# Patient Record
Sex: MALE
[De-identification: names, ages, dates, MRNs, and addresses within clinical notes are randomized per-mention and may not be internally consistent; named-entity substitution may affect disease eponyms.]

---

## 2020-07-05 ENCOUNTER — TRANSCRIPTION ENCOUNTER (OUTPATIENT)
Age: 70
End: 2020-07-05

## 2024-02-26 ENCOUNTER — APPOINTMENT (OUTPATIENT)
Dept: DERMATOLOGY | Facility: CLINIC | Age: 74
End: 2024-02-26
Payer: MEDICARE

## 2024-02-26 DIAGNOSIS — C44.519 BASAL CELL CARCINOMA OF SKIN OF OTHER PART OF TRUNK: ICD-10-CM

## 2024-02-26 PROBLEM — Z00.00 ENCOUNTER FOR PREVENTIVE HEALTH EXAMINATION: Status: ACTIVE | Noted: 2024-02-26

## 2024-02-26 PROCEDURE — 99203 OFFICE O/P NEW LOW 30 MIN: CPT

## 2024-02-26 NOTE — PHYSICAL EXAM
[Alert] : alert [Well Nourished] : well nourished [Oriented x 3] : ~L oriented x 3 [Conjunctiva Non-injected] : conjunctiva non-injected [No Visual Lymphadenopathy] : no visual  lymphadenopathy [No Clubbing] : no clubbing [No Edema] : no edema [No Bromhidrosis] : no bromhidrosis [No Chromhidrosis] : no chromhidrosis [Hair] : Hair [Scalp] : Scalp [Face] : Face [Nose] : Nose [Eyelids] : Eyelids [Ears] : Ears [Lips] : Lips [Neck] : Neck [Chest] : Chest [Abdomen] : Abdomen [Nodes] : Nodes [Back] : Back [FreeTextEntry3] : -left neck with 2.4 cm x 2.0 cm pink pearly plaque -no cervical adenopathy

## 2024-02-26 NOTE — PHYSICAL EXAM
Letter sent.    [Alert] : alert [Well Nourished] : well nourished [Oriented x 3] : ~L oriented x 3 [No Visual Lymphadenopathy] : no visual  lymphadenopathy [Conjunctiva Non-injected] : conjunctiva non-injected [No Clubbing] : no clubbing [No Edema] : no edema [No Bromhidrosis] : no bromhidrosis [No Chromhidrosis] : no chromhidrosis [Hair] : Hair [Scalp] : Scalp [Face] : Face [Nose] : Nose [Eyelids] : Eyelids [Lips] : Lips [Ears] : Ears [Neck] : Neck [Chest] : Chest [Abdomen] : Abdomen [Nodes] : Nodes [Back] : Back [FreeTextEntry3] : -left neck with 2.4 cm x 2.0 cm pink pearly plaque -no cervical adenopathy

## 2024-02-26 NOTE — HISTORY OF PRESENT ILLNESS
[FreeTextEntry1] : Infiltrative BCC Left Neck  [de-identified] : Mohs Surgery Consultation  02/26/2024   Referred by: Dr. Alexis Rnad, [facility]  We had the pleasure of seeing your patient in consultation for Mohs Micrographic Surgery.  Mr. FAUSTO FAJARDO is a 73 year old M who presents for evaluation of recently biopsied BCC on the left neck with infiltrative features. Not sure how long it had been there. No previous treatment.   Pertinent positives noted below  History of HIV or hepatitis: No Blood thinners: none Antibiotic Prophylaxis: None  Medical implants: None  Social History: no tobacco or alcohol   The patient's review of systems questionnaire was reviewed. Education needs were identified. There were no barriers to learning.

## 2024-02-26 NOTE — ASSESSMENT
[FreeTextEntry1] : Mohs surgery consultation for Infiltrative BCC Left neck  -- I explained the treatment options of topical immunomodulatory or chemotherapy, electrodessication and curettage, radiation therapy, excision and Mohs surgery.  I recommended Mohs surgery due to the tumor type, location, and ill-defined nature of cancer.   Mohs Appropriate Use Criteria (AUC) Score: 9   I explained that following extirpation there will be a full thickness defect of the involved area. The reconstructive options will be based on the defect size and surrounding tissue laxity of the involved area. Primary closure is only possible for smaller defects. For larger defects, local tissue rearrangement or skin grafting may be necessary. Risks following layered primary closure or local tissue rearrangement include wound dehiscence, contour irregularity, bleeding, infection, and paresthesia (nerve damage including sensory deficit or motor weakness). Risks following skin grafting include wound dehiscence, skin graft nonadherence (partial or complete), contour irregularity, bleeding, infection, paresthesia, and donor site complications. I explained that the patient will need to abstain from physical activity for 1-2 weeks following the surgery, that they would heal with a scar, and also discussed the chances of infection, bleeding, potential sensory or motor nerve damage, and recurrence.  The patient indicated that s/he understood the risks and wished to schedule the procedure. -- In particular, for reconstruction we discussed linear closure  Thank you for this Mohs surgery referral. We recommended that Mr. FAUSTO FAJARDO follow up with His referring dermatologist for routine skin exams following surgery.   Bay He MD Physician, Dermatology & Dermatologic Surgery [facility]

## 2024-02-26 NOTE — ASSESSMENT
[FreeTextEntry1] : Mohs surgery consultation for Infiltrative BCC Left neck  -- I explained the treatment options of topical immunomodulatory or chemotherapy, electrodessication and curettage, radiation therapy, excision and Mohs surgery.  I recommended Mohs surgery due to the tumor type, location, and ill-defined nature of cancer.   Mohs Appropriate Use Criteria (AUC) Score: 9   I explained that following extirpation there will be a full thickness defect of the involved area. The reconstructive options will be based on the defect size and surrounding tissue laxity of the involved area. Primary closure is only possible for smaller defects. For larger defects, local tissue rearrangement or skin grafting may be necessary. Risks following layered primary closure or local tissue rearrangement include wound dehiscence, contour irregularity, bleeding, infection, and paresthesia (nerve damage including sensory deficit or motor weakness). Risks following skin grafting include wound dehiscence, skin graft nonadherence (partial or complete), contour irregularity, bleeding, infection, paresthesia, and donor site complications. I explained that the patient will need to abstain from physical activity for 1-2 weeks following the surgery, that they would heal with a scar, and also discussed the chances of infection, bleeding, potential sensory or motor nerve damage, and recurrence.  The patient indicated that s/he understood the risks and wished to schedule the procedure. -- In particular, for reconstruction we discussed linear closure  Thank you for this Mohs surgery referral. We recommended that Mr. FAUSTO FAJARDO follow up with His referring dermatologist for routine skin exams following surgery.   Bay He MD Physician, Dermatology & Dermatologic Surgery [facility]

## 2024-02-26 NOTE — HISTORY OF PRESENT ILLNESS
[FreeTextEntry1] : Infiltrative BCC Left Neck  [de-identified] : Mohs Surgery Consultation  02/26/2024   Referred by: Dr. Alexis Rand, [facility]  We had the pleasure of seeing your patient in consultation for Mohs Micrographic Surgery.  Mr. FAUSTO FAJARDO is a 73 year old M who presents for evaluation of recently biopsied BCC on the left neck with infiltrative features. Not sure how long it had been there. No previous treatment.   Pertinent positives noted below  History of HIV or hepatitis: No Blood thinners: none Antibiotic Prophylaxis: None  Medical implants: None  Social History: no tobacco or alcohol   The patient's review of systems questionnaire was reviewed. Education needs were identified. There were no barriers to learning.

## 2024-03-27 ENCOUNTER — APPOINTMENT (OUTPATIENT)
Dept: DERMATOLOGY | Facility: CLINIC | Age: 74
End: 2024-03-27
Payer: MEDICARE

## 2024-03-27 DIAGNOSIS — C44.41 BASAL CELL CARCINOMA OF SKIN OF SCALP AND NECK: ICD-10-CM

## 2024-03-27 PROCEDURE — 17311 MOHS 1 STAGE H/N/HF/G: CPT

## 2024-03-27 PROCEDURE — 12042 INTMD RPR N-HF/GENIT2.6-7.5: CPT

## 2024-03-27 RX ORDER — MUPIROCIN 20 MG/G
2 OINTMENT TOPICAL TWICE DAILY
Qty: 1 | Refills: 6 | Status: ACTIVE | COMMUNITY
Start: 2024-03-27 | End: 1900-01-01

## 2024-03-28 PROBLEM — C44.41 BASAL CELL CARCINOMA (BCC) OF LEFT SIDE OF NECK: Status: ACTIVE | Noted: 2024-02-26

## 2024-03-28 NOTE — PROCEDURE
[TextEntry] : Mohs Surgery Procedure Note   A surgical time out was taken to confirm the patient's correct identity and the correct site. The operative site was identified by the patient and surgical team prior to surgery and the patient agreed to proceed with the surgical site which was marked prior to anesthesia infiltration.   Mohs Micrographic Surgery Report Date Collected: 03/27/2024 Date Received: Same as above Date Verified: Same as above Attending Surgeon: Bay He MD Assistants: Monie Hinkle RN, Lin Candelario MA Procedure#:  Diagnosis: infiltrative BCC Location: left neck Pre-op Size: 2.1 cm x 1.8 cm  Post-Operative Final Defect Size: 4.0 cm x 3.5 cm  Stages (number of pieces per stage): 1 (2/1)  Pathology, if tumor noted in stages: Debulk with atypical basaloid cells consistent with BCC. Stage I with Sparse perivascular lymphocytic infiltrate and no evidence of residual carcinoma.  Indications for procedure: Ill-defined tumor margins, high-priority anatomic location for preservation of normal tissue, aggressive histologic nature of the tumor Repair type: intermediate linear layered  Subcutaneous and dermal sutures used: 3-0 vicryl, 4-0 vicryl Epidermal sutures: 4-0 chromic Total volume of anesthesia: 16 ml Repair length: 7.0 cm    Mohs Procedure Report:   The patient was escorted to the outpatient surgical operatory. The proposed Mohs procedure and reconstruction options were discussed with the patient. The risks, benefits, and alternatives were discussed and the patient signed a written consent form. A time out was taken to confirm the patient's identity and the exact location of the skin cancer. After the patient was placed in a recumbent position, the surgical site was cleaned with alcohol and either Betadine (for eyes and ears) or chlorhexidine gluconate, draped, and infiltrated with 0.5%  lidocaine with 1:200,000 epinephrine local anesthetic. A sterile surgical marking pen was used to outline a thin margin of normal-appearing skin around the tumor. A beveled incision was then made using a scalpel. Small orienting nicks were made on the specimen and the surrounding skin. The tissue was then sharply dissected from the surrounding skin. Hemostasis was maintained with the electrosurgical unit and/or pressure. A temporary dressing was placed on the surgical defect until the frozen section slides were interpreted. The oriented specimen was placed in a Xiomy dish and transported to the Mohs laboratory. For each stage of the procedure, a visual representation of the specimen was drawn on a Mohs map. This map graphically depicts the specimen's two-dimensional appearance, orientation, and tissue preparation, which consists of dividing the specimen and applying tissue dyes. Because the deep and peripheral portions of the tissue are then embedded in the same geometric plane, the map also represents an oriented scale drawing of the resulting histologic sections. The Mohs technician then prepared frozen section slides using standard techniques. The slides were stained with hematoxylin and eosin, and cover slips were applied. The frozen section slides were then examined under the microscope. If tumor was found, it was localized on the map. The orienting nicks on the original specimen corresponded to similar nicks on the surgical defect so areas of identified tumor could be mapped back to the patient and resected. Additional layers of removed skin were then processed as indicated above. This iterative process continued as applicable until no tumor was observed microscopically. At this stage, the Mohs resection was complete.   INTERMEDIATE LINEAR LAYERED CLOSURE RECONSTRUCTION PROCEDURE NOTE   Prior to beginning the procedure, patient identity was verified, as well as the procedure to be performed and the site.  All equipment required was ready and available. The patient was positioned appropriately.  INDICATIONS:  Various reconstructive modalities were discussed with the patient, and it was decided that an intermediate linear layered closure would best preserve normal anatomical and functional relationships. After a discussion of the risks including but not limited to bleeding, scarring, infection, nerve damage, unsatisfactory results, and wound dehiscense, informed consent was obtained, and the patient underwent the procedure as follows.  PROCEDURE:  The patient was taken to the operative suite and placed supine on the operating room table. The area was anesthetized with 1% Lidocaine with 1/100,000 epinephrine. The area was washed with Chlorhexidine or Betadine, rinsed with sterile saline, and draped with sterile towels. The wound edges were debeveled, and the wound was undermined widely in all directions if needed. Hemostasis was obtained with spot electrodessication if needed. Deep dermal and subcutaneous closure was performed using the indicated buried sutures.  Using a 15 blade scalpel, redundant cones were removed as Burow's triangles to align with the relaxed skin tension lines in a curvilinear fashion if needed. The epidermis was closed and approximated using the indicated sutures. The final wound length is noted above. A sterile pressure dressing was applied, and wound care instructions were given.   FINAL PROCEDURE: Intermediate linear layered closure  COMPLICATIONS: None

## 2024-03-28 NOTE — PHYSICAL EXAM
[Alert] : alert [Oriented x 3] : ~L oriented x 3 [Well Nourished] : well nourished [Conjunctiva Non-injected] : conjunctiva non-injected [No Visual Lymphadenopathy] : no visual  lymphadenopathy [No Clubbing] : no clubbing [No Edema] : no edema [No Bromhidrosis] : no bromhidrosis [No Chromhidrosis] : no chromhidrosis [Hair] : Hair [Scalp] : Scalp [Face] : Face [Nose] : Nose [Eyelids] : Eyelids [Ears] : Ears [Neck] : Neck [Lips] : Lips [Chest] : Chest [Back] : Back [Abdomen] : Abdomen [FreeTextEntry3] : -left neck with 2.1 cm x 1.8 cm pink pearly plaque -no cervical adenopathy [Nodes] : Nodes

## 2024-03-28 NOTE — ASSESSMENT
[FreeTextEntry1] : Mohs surgery for infiltrative BCC left Neck -- 1  stage(s) of Mohs surgery performed 03/27/2024 -- intermediate linear layered repair performed -- f/u for wound check PRN -mupirocin 2% BID -- f/u for routine skin exams as previously recommended by His referring dermatologist.   Thank you for this Mohs surgery referral.   Bay He MD Physician, Dermatology & Dermatologic Surgery [facility]

## 2024-03-28 NOTE — HISTORY OF PRESENT ILLNESS
[de-identified] : Mohs Surgery Consultation  3/27/2024   Referred by: Dr. Alexis Rand, [facility]  We had the pleasure of seeing your patient in consultation for Mohs Micrographic Surgery.  Mr. FAUSTO FAJARDO is a 73 year old M who presents for evaluation of recently biopsied BCC on the left neck with infiltrative features. Not sure how long it had been there. No previous treatment.   Pertinent positives noted below  History of HIV or hepatitis: No Blood thinners: none Antibiotic Prophylaxis: None  Medical implants: None  Social History: no tobacco or alcohol   The patient's review of systems questionnaire was reviewed. Education needs were identified. There were no barriers to learning.  Mohs surgery consultation for infiltrative BCC left neck  -- I explained the treatment options of topical immunomodulatory or chemotherapy, electrodessication and curettage, radiation therapy, excision and Mohs surgery.  I recommended Mohs surgery due to the tumor type, location, and ill-defined nature of cancer.   Mohs Appropriate Use Criteria (AUC) Score: 9  I explained that following extirpation there will be a full thickness defect of the involved area. The reconstructive options will be based on the defect size and surrounding tissue laxity of the involved area. Primary closure is only possible for smaller defects. For larger defects, local tissue rearrangement or skin grafting may be necessary. Risks following layered primary closure or local tissue rearrangement include wound dehiscence, contour irregularity, bleeding, infection, and paresthesia (nerve damage including sensory deficit or motor weakness). Risks following skin grafting include wound dehiscence, skin graft nonadherence (partial or complete), contour irregularity, bleeding, infection, paresthesia, and donor site complications. I explained that the patient will need to abstain from physical activity for 1-2 weeks following the surgery, that they would heal with a scar, and also discussed the chances of infection, bleeding, potential sensory or motor nerve damage, and recurrence.  The patient indicated that s/he understood the risks and wished to proceed today -- In particular, for reconstruction we discussed linear closure  Thank you for this Mohs surgery referral. We recommended that Mr. FAUSTO FAJARDO follow up with His referring dermatologist for routine skin exams following surgery.       [FreeTextEntry1] : Infiltrative BCC Left Neck

## 2025-01-07 PROBLEM — C44.612 PRIMARY BASAL CELL CARCINOMA (BCC) OF RIGHT UPPER EXTREMITY: Status: ACTIVE | Noted: 2025-01-07

## 2025-01-08 ENCOUNTER — APPOINTMENT (OUTPATIENT)
Dept: DERMATOLOGY | Facility: CLINIC | Age: 75
End: 2025-01-08
Payer: MEDICARE

## 2025-01-08 ENCOUNTER — NON-APPOINTMENT (OUTPATIENT)
Age: 75
End: 2025-01-08

## 2025-01-08 DIAGNOSIS — C44.612 BASAL CELL CARCINOMA OF SKIN OF RIGHT UPPER LIMB, INCLUDING SHOULDER: ICD-10-CM

## 2025-01-08 PROCEDURE — 17313 MOHS 1 STAGE T/A/L: CPT

## 2025-01-08 PROCEDURE — 12032 INTMD RPR S/A/T/EXT 2.6-7.5: CPT
